# Patient Record
Sex: FEMALE | Race: BLACK OR AFRICAN AMERICAN | NOT HISPANIC OR LATINO | Employment: UNEMPLOYED | URBAN - METROPOLITAN AREA
[De-identification: names, ages, dates, MRNs, and addresses within clinical notes are randomized per-mention and may not be internally consistent; named-entity substitution may affect disease eponyms.]

---

## 2023-09-05 ENCOUNTER — HOSPITAL ENCOUNTER (EMERGENCY)
Facility: HOSPITAL | Age: 33
Discharge: HOME | End: 2023-09-05
Attending: EMERGENCY MEDICINE
Payer: OTHER GOVERNMENT

## 2023-09-05 VITALS
RESPIRATION RATE: 18 BRPM | HEART RATE: 146 BPM | DIASTOLIC BLOOD PRESSURE: 78 MMHG | OXYGEN SATURATION: 96 % | HEIGHT: 64 IN | WEIGHT: 260 LBS | TEMPERATURE: 100 F | SYSTOLIC BLOOD PRESSURE: 133 MMHG | BODY MASS INDEX: 44.39 KG/M2

## 2023-09-05 DIAGNOSIS — M77.11 LATERAL EPICONDYLITIS OF RIGHT ELBOW: ICD-10-CM

## 2023-09-05 DIAGNOSIS — M25.529 ELBOW PAIN: ICD-10-CM

## 2023-09-05 DIAGNOSIS — M25.511 ACUTE PAIN OF RIGHT SHOULDER: Primary | ICD-10-CM

## 2023-09-05 DIAGNOSIS — M25.511 SHOULDER PAIN, RIGHT: ICD-10-CM

## 2023-09-05 LAB
B-HCG UR QL: NEGATIVE
CTP QC/QA: YES

## 2023-09-05 PROCEDURE — 81025 URINE PREGNANCY TEST: CPT

## 2023-09-05 PROCEDURE — 99284 EMERGENCY DEPT VISIT MOD MDM: CPT

## 2023-09-05 RX ORDER — MELOXICAM 7.5 MG/1
7.5 TABLET ORAL DAILY
Qty: 12 TABLET | Refills: 0 | Status: SHIPPED | OUTPATIENT
Start: 2023-09-05

## 2023-09-05 RX ORDER — LIDOCAINE 50 MG/G
1 PATCH TOPICAL DAILY
Qty: 5 PATCH | Refills: 0 | Status: SHIPPED | OUTPATIENT
Start: 2023-09-05

## 2023-09-05 NOTE — Clinical Note
"Jade Kearneytimmy Tyler was seen and treated in our emergency department on 9/5/2023.  She may return to work on 09/09/2023.       If you have any questions or concerns, please don't hesitate to call.      Rachelle Lynne PA-C"

## 2023-09-05 NOTE — ED TRIAGE NOTES
Pt. Reports she gas been having right shoulder pain for weeks. Pt. Reports a crack in her arm at the rime of pain.

## 2023-09-05 NOTE — DISCHARGE INSTRUCTIONS
Thank you for coming to our Emergency Department today. It is important to remember that some problems or medical conditions are difficult to diagnose and may not be found or addressed during your Emergency Department visit.  These conditions often start with non-specific symptoms and can only be diagnosed on follow up visits with your primary care physician or specialist when the symptoms continue or change. Please remember that all medical conditions can change, and we cannot predict how you will be feeling tomorrow or the next day. Return to the ER with any questions/concerns, new/concerning symptoms, worsening or failure to improve.       Be sure to follow up with your primary care doctor and review all labs/imaging/tests that were performed during your ER visit with them. It is very common for us to identify non-emergent incidental findings which must be followed up with your primary care physician.  Some labs/imaging/tests may be outside of the normal range, and require non-emergent follow-up and/or further investigation/treatment/procedures/testing to help diagnose/exclude/prevent complications or other potentially serious medical conditions. Some abnormalities may not have been discussed or addressed during your ER visit.     An ER visit does not replace a primary care visit, and many screening tests or follow-up tests cannot be ordered by an ER doctor or performed by the ER. Some tests may even require pre-approval.    If you do not have a primary care doctor, you may contact the one listed on your discharge paperwork or you may also call the [facility] Appointment Desk at [phone] , or [address] [place] [address] at  [phone] to schedule an appointment, or establish care with a primary care doctor or even a specialist and to obtain information about local resources. It is important to your health that you have a primary care doctor.    Please take all medications as directed. We have done our best to select  a medication for you that will treat your condition however, all medications may potentially have side-effects and it is impossible to predict which medications may give you side-effects or what those side-effects (if any) those medications may give you.  If you feel that you are having a negative effect or side-effect of any medication you should stop taking those medications immediately and seek medical attention. If you feel that you are having a life-threatening reaction call 911.        Do not drive, swim, climb to height, take a bath, operate heavy machinery, drink alcohol or take potentially sedating medications, sign any legal documents or make any important decisions for 24 hours if you have received any pain medications, sedatives or mood altering drugs during your ER visit or within 24 hours of taking them if they have been prescribed to you.     You can find additional resources for Dentists, hearing aids, durable medical equipment, low cost pharmacies and other resources at [email or web address]

## 2023-09-05 NOTE — ED PROVIDER NOTES
"Encounter Date: 9/5/2023       History     Chief Complaint   Patient presents with    Shoulder Pain     Pt reports pain to right shoulder x2 weeks ago. Pt reports she moved her arm wrong a couple weeks ago and heard her shoulder "crack". Now the shoulder is getting progressively more painful.     33-year-old female presents for emergent evaluation of right shoulder and elbow pain.  Patient states she was at work 2 months ago she fell off a ladder landing on her right shoulder.  She states she would not get evaluated after this fall and reports intermittent shoulder pain relieved with over-the-counter NSAIDs x2 months.  She states that about 2 weeks ago she was stretching her shoulder when she felt a pop and immediate pain to that same shoulder.  Since this occurrence she reports weakness to right shoulder. She is been using over-the-counter NSAIDs with some relief but reports when she woke this morning pain was quite unbearable.  She also notes right medial elbow pain x2 months with no known injury.  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  She notes that pain improves with rest and worsens with flexion and extension movement.  She denies any other injuries, numbness or tingling, discoloration.    The history is provided by the patient and the spouse. No  was used.     Review of patient's allergies indicates:  No Known Allergies  History reviewed. No pertinent past medical history.  History reviewed. No pertinent surgical history.  History reviewed. No pertinent family history.  Social History     Tobacco Use    Smoking status: Never    Smokeless tobacco: Never   Substance Use Topics    Alcohol use: Never    Drug use: Never     Review of Systems   Constitutional:  Negative for chills and fever.   HENT:  Negative for congestion, ear pain, nosebleeds, rhinorrhea, sore throat and trouble swallowing.    Eyes:  Negative for redness.   Respiratory:  Negative for cough, shortness " of breath and stridor.    Cardiovascular:  Negative for chest pain.   Gastrointestinal:  Negative for abdominal pain, constipation, diarrhea, nausea and vomiting.   Genitourinary:  Negative for decreased urine volume, dysuria, frequency, hematuria and urgency.   Musculoskeletal:  Positive for arthralgias. Negative for back pain and neck pain.   Skin:  Negative for rash and wound.   Neurological:  Positive for weakness. Negative for dizziness, speech difficulty, light-headedness, numbness and headaches.   Psychiatric/Behavioral:  Negative for confusion.        Physical Exam     Initial Vitals [09/05/23 1324]   BP Pulse Resp Temp SpO2   133/78 84 18 98.7 °F (37.1 °C) 97 %      MAP       --         Physical Exam    Nursing note and vitals reviewed.  Constitutional: She appears well-developed and well-nourished. She is not diaphoretic. No distress.   HENT:   Head: Normocephalic and atraumatic.   Right Ear: External ear normal.   Left Ear: External ear normal.   Mouth/Throat: Oropharynx is clear and moist.   Eyes: EOM are normal. Right eye exhibits no discharge. Left eye exhibits no discharge. No scleral icterus.   Neck: Neck supple. No tracheal deviation present.   Normal range of motion.  Cardiovascular:  Normal rate, regular rhythm and normal heart sounds.           Pulmonary/Chest: Breath sounds normal. No respiratory distress. She has no wheezes.   Abdominal: Abdomen is soft. She exhibits no distension.   Musculoskeletal:         General: Tenderness present.      Cervical back: Normal range of motion and neck supple.      Comments: Patient has decreased range of motion to right shoulder with lateral raise, shoulder extension and flexion.  There is 3/5 weakness to right shoulder.  2+ pulses.  Sensation intact.  No notable bony abnormalities, rashes or wounds.  Tenderness to palpation of right scapula and AC joint.  No notable bony clicks with movement     Neurological: She is alert and oriented to person, place, and  time. She has normal strength. GCS score is 15. GCS eye subscore is 4. GCS verbal subscore is 5. GCS motor subscore is 6.   Skin: Skin is warm and dry. Capillary refill takes less than 2 seconds. No rash noted.   Psychiatric: She has a normal mood and affect. Thought content normal.         ED Course   Procedures  Labs Reviewed   POCT URINE PREGNANCY          Imaging Results              X-Ray Elbow Complete Right (Final result)  Result time 09/05/23 14:45:49      Final result by Kee Cary III, MD (09/05/23 14:45:49)                   Impression:      No acute process seen.      Electronically signed by: Kee Cary MD  Date:    09/05/2023  Time:    14:45               Narrative:    EXAMINATION:  XR ELBOW COMPLETE 3 VIEW RIGHT    CLINICAL HISTORY:  Pain in unspecified elbow    FINDINGS:  Elbow complete three views right: No fracture dislocation bone destruction seen.  No joint effusion seen.                        Final result by Kee Cary III, MD (09/05/23 14:45:49)                   Impression:      No acute process seen.      Electronically signed by: Kee Cary MD  Date:    09/05/2023  Time:    14:45               Narrative:    EXAMINATION:  XR ELBOW COMPLETE 3 VIEW RIGHT    CLINICAL HISTORY:  Pain in unspecified elbow    FINDINGS:  Elbow complete three views right: No fracture dislocation bone destruction seen.  No joint effusion seen.                        Final result by Kee Cary III, MD (09/05/23 14:45:49)                   Impression:      No acute process seen.      Electronically signed by: Kee Cary MD  Date:    09/05/2023  Time:    14:45               Narrative:    EXAMINATION:  XR ELBOW COMPLETE 3 VIEW RIGHT    CLINICAL HISTORY:  Pain in unspecified elbow    FINDINGS:  Elbow complete three views right: No fracture dislocation bone destruction seen.  No joint effusion seen.                        Final result by Kee Cary III, MD (09/05/23 14:45:49)                    Impression:      No acute process seen.      Electronically signed by: Kee Cary MD  Date:    09/05/2023  Time:    14:45               Narrative:    EXAMINATION:  XR ELBOW COMPLETE 3 VIEW RIGHT    CLINICAL HISTORY:  Pain in unspecified elbow    FINDINGS:  Elbow complete three views right: No fracture dislocation bone destruction seen.  No joint effusion seen.                                       X-Ray Shoulder Complete 2 View Right (Final result)  Result time 09/05/23 14:47:03      Final result by Kee Cary III, MD (09/05/23 14:47:03)                   Impression:      No acute process seen.      Electronically signed by: Kee Cary MD  Date:    09/05/2023  Time:    14:47               Narrative:    EXAMINATION:  XR SHOULDER COMPLETE 2 OR MORE VIEWS RIGHT    CLINICAL HISTORY:  Pain in right shoulder    FINDINGS:  Shoulder complete three views right: No fracture, dislocation, or bone destruction seen.  No acute trauma seen.                        Final result by Kee Cary III, MD (09/05/23 14:47:03)                   Impression:      No acute process seen.      Electronically signed by: Kee Cary MD  Date:    09/05/2023  Time:    14:47               Narrative:    EXAMINATION:  XR SHOULDER COMPLETE 2 OR MORE VIEWS RIGHT    CLINICAL HISTORY:  Pain in right shoulder    FINDINGS:  Shoulder complete three views right: No fracture, dislocation, or bone destruction seen.  No acute trauma seen.                        Final result by Kee Cary III, MD (09/05/23 14:47:03)                   Impression:      No acute process seen.      Electronically signed by: Kee Cary MD  Date:    09/05/2023  Time:    14:47               Narrative:    EXAMINATION:  XR SHOULDER COMPLETE 2 OR MORE VIEWS RIGHT    CLINICAL HISTORY:  Pain in right shoulder    FINDINGS:  Shoulder complete three views right: No fracture, dislocation, or bone destruction seen.  No acute trauma seen.                         Final result by Kee Cary III, MD (09/05/23 14:47:03)                   Impression:      No acute process seen.      Electronically signed by: Kee Cary MD  Date:    09/05/2023  Time:    14:47               Narrative:    EXAMINATION:  XR SHOULDER COMPLETE 2 OR MORE VIEWS RIGHT    CLINICAL HISTORY:  Pain in right shoulder    FINDINGS:  Shoulder complete three views right: No fracture, dislocation, or bone destruction seen.  No acute trauma seen.                                    X-Rays:   Independently Interpreted Readings:   Other Readings:  Shoulder and elbow x-ray without any bony abnormalities, fractures or dislocations noted.    Medications - No data to display  Medical Decision Making  33-year-old female presents for emergent evaluation of right shoulder and elbow pain.  Patient states she was at work 2 months ago she fell off a ladder landing on her right shoulder.  She states she would not get evaluated after this fall and reports intermittent shoulder pain relieved with over-the-counter NSAIDs x2 months.  She states that about 2 weeks ago she was stretching her shoulder when she felt a pop and immediate pain to that same shoulder.  Since this occurrence she reports weakness to right shoulder. She is been using over-the-counter NSAIDs with some relief but reports when she woke this morning pain was quite unbearable.  She also notes right medial elbow pain x2 months with no known injury.  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  She notes that pain improves with rest and worsens with flexion and extension movement.  She denies any other injuries, numbness or tingling, discoloration.  Patient has decreased range of motion to right shoulder with lateral raise, shoulder extension and flexion.  There is 3/5 weakness to right shoulder.  2+ pulses.  Sensation intact.  No notable bony abnormalities, rashes or wounds.  Tenderness to palpation of right scapula and AC  joint.  No notable bony clicks with movement.   Ddx includes fracture, rotator cuff tear, muscular tear, arthritis  X ray elbow no fracture dislocation bone destruction seen.  No joint effusion seen.   X ray shoulder no fracture, dislocation, or bone destruction seen.  No acute trauma seen.   I explained that x-rays are only able to distinguish abnormalities in the bone and can not determine muscular or tendon abnormality.  Patient offered IM Toradol and declines.  Ambulatory referral to orthopedics placed for further imaging of muscle and tendons.  Discharged home with NSAIDs and topical lidocaine patches. Apply a compressive ACE bandage. Rest and elevate the affected painful area.  Apply cold compresses intermittently as needed.  As pain recedes, begin normal activities slowly as tolerated.  Call if symptoms persist.    I discussed with the patient/family the diagnosis, treatment plan, indications for return to the emergency department, and for expected follow-up. The patient/family verbalized an understanding. The patient/family is asked if there are any questions or concerns. We discuss the case, until all issues are addressed to the patient/family's satisfaction. Patient/family understands and is agreeable to the plan.      Amount and/or Complexity of Data Reviewed  Independent Historian: spouse     Details: Patient and spouse agree to plan  External Data Reviewed: labs and notes.     Details: External data reviewed for full patient history  Labs: ordered. Decision-making details documented in ED Course.     Details: UPT negative  Radiology: ordered and independent interpretation performed. Decision-making details documented in ED Course.     Details: X ray elbow no fracture dislocation bone destruction seen.  No joint effusion seen.   X ray shoulder no fracture, dislocation, or bone destruction seen.  No acute trauma seen.       Risk  OTC drugs.  Prescription drug management.  Diagnosis or treatment  significantly limited by social determinants of health.    .[facility]           ED Course as of 09/05/23 1538   Tue Sep 05, 2023   1451 No bony abnormalities noted on shoulder x-ray [CC]   1451 No acute process seen on elbow x-ray [CC]      ED Course User Index  [CC] Rachelle Lynne PA-C                    Clinical Impression:   Final diagnoses:  [M25.511] Shoulder pain, right  [M25.529] Elbow pain  [M25.511] Acute pain of right shoulder (Primary)  [M77.11] Lateral epicondylitis of right elbow        ED Disposition Condition    Discharge Stable          ED Prescriptions       Medication Sig Dispense Start Date End Date Auth. Provider    meloxicam (MOBIC) 7.5 MG tablet Take 1 tablet (7.5 mg total) by mouth once daily. 12 tablet 9/5/2023 -- Rachelle Lynne PA-C    LIDOcaine (LIDODERM) 5 % Place 1 patch onto the skin once daily. Remove & Discard patch within 12 hours or as directed by MD 5 patch 9/5/2023 -- Rachelle Lynne PA-C          Follow-up Information       Follow up With Specialties Details Why Contact Info    [facility] Emergency Dept Emergency Medicine Go to  For new or worsening symptoms [address]  [facility] [id number] [phone]             Rachelle Lynne PA-C  09/05/23 1532       Rachelle Lynne PA-C  09/05/23 1538